# Patient Record
Sex: FEMALE | URBAN - METROPOLITAN AREA
[De-identification: names, ages, dates, MRNs, and addresses within clinical notes are randomized per-mention and may not be internally consistent; named-entity substitution may affect disease eponyms.]

---

## 2019-02-11 ENCOUNTER — IMPORTED ENCOUNTER (OUTPATIENT)
Dept: URBAN - METROPOLITAN AREA CLINIC 1 | Facility: CLINIC | Age: 59
End: 2019-02-11

## 2019-02-11 PROBLEM — H04.123: Noted: 2019-02-11

## 2019-02-11 PROBLEM — H25.813: Noted: 2019-02-11

## 2019-02-11 PROBLEM — H16.143: Noted: 2019-02-11

## 2019-02-11 PROBLEM — H40.1132: Noted: 2019-02-11

## 2019-02-11 PROCEDURE — 92133 CPTRZD OPH DX IMG PST SGM ON: CPT

## 2019-02-11 PROCEDURE — 92014 COMPRE OPH EXAM EST PT 1/>: CPT

## 2019-02-11 NOTE — PATIENT DISCUSSION
1.  Moderate Open Angle Glaucoma OU (0.8 OU)- Stable IOP OU. OCT shows slight progression OU add Alphagan P BID OU (Samples x2 given)  continue Latanoprost OU QHS. Patient advised to be compliant with gtts. ***Consider adding a gtt or ALT w/ any future progression. 2.  AAYUSH w/ PEK OU- Cont ATs TID OU Routinely. 3.  Cataract OU: Observe for now without intervention. The patient was advised to contact us if any change or worsening of visionLetter to PCP Return for an appointment in 2 mo 10 with Dr. Mine Alvarez.

## 2019-04-11 ENCOUNTER — IMPORTED ENCOUNTER (OUTPATIENT)
Dept: URBAN - METROPOLITAN AREA CLINIC 1 | Facility: CLINIC | Age: 59
End: 2019-04-11

## 2019-04-11 PROBLEM — H25.813: Noted: 2019-04-11

## 2019-04-11 PROBLEM — H16.143: Noted: 2019-04-11

## 2019-04-11 PROBLEM — H04.123: Noted: 2019-04-11

## 2019-04-11 PROBLEM — H40.1132: Noted: 2019-04-11

## 2019-04-11 PROCEDURE — 99213 OFFICE O/P EST LOW 20 MIN: CPT

## 2019-04-11 NOTE — PATIENT DISCUSSION
1.  Moderate Open Angle Glaucoma OU (CD: 0.80 OU) -- IOP improved w/ additional gtt tx Alphagan. Use Alphagan-P BID OU (Coupon Given & ERx'd). Continue Latanoprost OU QHS. ***Consider adding a gtt or ALT w/ any future progression*** Patient advised to be compliant with gtts. Patient to continue with current gtt regimen. Patient advised to be compliant with gtts. Condition was discussed with patient and patient understands. Will continue to monitor patient for any progression in condition. Patient was advised to call us with any problems questions or concerns. 2.  AAYUSH w/ PEK OU -- Cont ATs TID OU Routinely. 3.  Cataracts OU -- Observe for now without intervention. The patient was advised to contact us if any change or worsening of vision. Return for an appointment in September 2019 for a 10 / 24-2 HVF OU with Dr. Irene Rose.

## 2019-05-03 ENCOUNTER — IMPORTED ENCOUNTER (OUTPATIENT)
Dept: URBAN - METROPOLITAN AREA CLINIC 1 | Facility: CLINIC | Age: 59
End: 2019-05-03

## 2019-05-03 PROBLEM — H52.4: Noted: 2019-05-03

## 2019-05-03 PROBLEM — H52.13: Noted: 2019-05-03

## 2019-05-03 PROCEDURE — S0621 ROUTINE OPHTHALMOLOGICAL EXA: HCPCS

## 2019-05-03 NOTE — PATIENT DISCUSSION
1. Myopia: Rx was given for correction if indicated and requested. 2. Presbyopia3. Cataracts OU -- Observe4   AAYUSH w/ PEK OU -- Cont ATs TID OU Routinely. 5.  Moderate Open Angle Glaucoma OU (CD: 0.80 OU) -- IOP stable today. Patient taking Alphagan BID OU and Latanoprost QHS OU. ***Consider adding a gtt or ALT w/ any future progression*** Return for an appointment as scheduled in September for a 10 / 24-2 HVF OU with Dr. Jax Steiner. Return for an appointment in 1 year for a 36 with Dr. Heena Tovar.

## 2022-04-02 ASSESSMENT — VISUAL ACUITY
OS_SC: 20/20
OS_CC: J1
OD_SC: 20/20
OS_SC: 20/20
OD_CC: J1
OD_CC: J1
OD_SC: 20/20
OS_CC: J1
OD_SC: 20/20-1
OS_SC: 20/20

## 2022-04-02 ASSESSMENT — TONOMETRY
OD_IOP_MMHG: 17
OD_IOP_MMHG: 13
OS_IOP_MMHG: 14
OD_IOP_MMHG: 15
OS_IOP_MMHG: 17
OS_IOP_MMHG: 15